# Patient Record
Sex: FEMALE | Race: WHITE | NOT HISPANIC OR LATINO
[De-identification: names, ages, dates, MRNs, and addresses within clinical notes are randomized per-mention and may not be internally consistent; named-entity substitution may affect disease eponyms.]

---

## 2017-11-20 ENCOUNTER — APPOINTMENT (OUTPATIENT)
Dept: OTOLARYNGOLOGY | Facility: CLINIC | Age: 57
End: 2017-11-20
Payer: COMMERCIAL

## 2017-11-20 VITALS
DIASTOLIC BLOOD PRESSURE: 76 MMHG | WEIGHT: 246 LBS | BODY MASS INDEX: 42 KG/M2 | HEIGHT: 64 IN | SYSTOLIC BLOOD PRESSURE: 110 MMHG | HEART RATE: 72 BPM

## 2017-11-20 DIAGNOSIS — Z86.79 PERSONAL HISTORY OF OTHER DISEASES OF THE CIRCULATORY SYSTEM: ICD-10-CM

## 2017-11-20 DIAGNOSIS — Z86.39 PERSONAL HISTORY OF OTHER ENDOCRINE, NUTRITIONAL AND METABOLIC DISEASE: ICD-10-CM

## 2017-11-20 DIAGNOSIS — R07.0 PAIN IN THROAT: ICD-10-CM

## 2017-11-20 DIAGNOSIS — Z83.3 FAMILY HISTORY OF DIABETES MELLITUS: ICD-10-CM

## 2017-11-20 DIAGNOSIS — Z78.9 OTHER SPECIFIED HEALTH STATUS: ICD-10-CM

## 2017-11-20 PROCEDURE — 31575 DIAGNOSTIC LARYNGOSCOPY: CPT

## 2017-11-20 PROCEDURE — 99204 OFFICE O/P NEW MOD 45 MIN: CPT | Mod: 25

## 2017-11-30 ENCOUNTER — APPOINTMENT (OUTPATIENT)
Dept: GASTROENTEROLOGY | Facility: CLINIC | Age: 57
End: 2017-11-30
Payer: COMMERCIAL

## 2017-11-30 VITALS
DIASTOLIC BLOOD PRESSURE: 90 MMHG | HEIGHT: 64 IN | TEMPERATURE: 98.7 F | SYSTOLIC BLOOD PRESSURE: 124 MMHG | BODY MASS INDEX: 42.17 KG/M2 | WEIGHT: 247 LBS

## 2017-11-30 DIAGNOSIS — Z77.22 CONTACT WITH AND (SUSPECTED) EXPOSURE TO ENVIRONMENTAL TOBACCO SMOKE (ACUTE) (CHRONIC): ICD-10-CM

## 2017-11-30 DIAGNOSIS — Z80.3 FAMILY HISTORY OF MALIGNANT NEOPLASM OF BREAST: ICD-10-CM

## 2017-11-30 DIAGNOSIS — Z83.71 FAMILY HISTORY OF COLONIC POLYPS: ICD-10-CM

## 2017-11-30 PROCEDURE — 99203 OFFICE O/P NEW LOW 30 MIN: CPT

## 2017-11-30 RX ORDER — B-COMPLEX WITH VITAMIN C
TABLET ORAL
Refills: 0 | Status: ACTIVE | COMMUNITY

## 2017-11-30 RX ORDER — MV-MIN/FOLIC/VIT K/LYCOP/COQ10 200-100MCG
CAPSULE ORAL
Refills: 0 | Status: ACTIVE | COMMUNITY

## 2017-12-11 ENCOUNTER — MESSAGE (OUTPATIENT)
Age: 57
End: 2017-12-11

## 2017-12-29 ENCOUNTER — APPOINTMENT (OUTPATIENT)
Dept: OTOLARYNGOLOGY | Facility: CLINIC | Age: 57
End: 2017-12-29
Payer: COMMERCIAL

## 2017-12-29 VITALS
DIASTOLIC BLOOD PRESSURE: 77 MMHG | HEIGHT: 64 IN | BODY MASS INDEX: 42.17 KG/M2 | SYSTOLIC BLOOD PRESSURE: 129 MMHG | HEART RATE: 72 BPM | WEIGHT: 247 LBS

## 2017-12-29 PROCEDURE — 99214 OFFICE O/P EST MOD 30 MIN: CPT | Mod: 25

## 2017-12-29 PROCEDURE — 31575 DIAGNOSTIC LARYNGOSCOPY: CPT

## 2017-12-29 RX ORDER — METOPROLOL SUCCINATE 50 MG/1
50 TABLET, EXTENDED RELEASE ORAL
Qty: 30 | Refills: 0 | Status: ACTIVE | COMMUNITY
Start: 2017-03-23

## 2017-12-29 RX ORDER — CLOBETASOL PROPIONATE 0.5 MG/G
0.05 CREAM TOPICAL
Qty: 15 | Refills: 0 | Status: ACTIVE | COMMUNITY
Start: 2016-11-16

## 2017-12-29 RX ORDER — BLOOD SUGAR DIAGNOSTIC
STRIP MISCELLANEOUS
Qty: 50 | Refills: 0 | Status: ACTIVE | COMMUNITY
Start: 2017-06-24

## 2017-12-29 RX ORDER — LANCETS 30 GAUGE
EACH MISCELLANEOUS
Qty: 100 | Refills: 0 | Status: ACTIVE | COMMUNITY
Start: 2017-06-24

## 2018-01-03 ENCOUNTER — MEDICATION RENEWAL (OUTPATIENT)
Age: 58
End: 2018-01-03

## 2018-01-15 ENCOUNTER — FORM ENCOUNTER (OUTPATIENT)
Age: 58
End: 2018-01-15

## 2018-01-16 ENCOUNTER — APPOINTMENT (OUTPATIENT)
Dept: MRI IMAGING | Facility: CLINIC | Age: 58
End: 2018-01-16

## 2018-01-16 ENCOUNTER — OUTPATIENT (OUTPATIENT)
Dept: OUTPATIENT SERVICES | Facility: HOSPITAL | Age: 58
LOS: 1 days | End: 2018-01-16
Payer: COMMERCIAL

## 2018-01-16 ENCOUNTER — APPOINTMENT (OUTPATIENT)
Dept: CT IMAGING | Facility: CLINIC | Age: 58
End: 2018-01-16

## 2018-01-16 DIAGNOSIS — Z98.89 OTHER SPECIFIED POSTPROCEDURAL STATES: Chronic | ICD-10-CM

## 2018-01-16 DIAGNOSIS — K60.3 ANAL FISTULA: Chronic | ICD-10-CM

## 2018-01-16 DIAGNOSIS — Z00.8 ENCOUNTER FOR OTHER GENERAL EXAMINATION: ICD-10-CM

## 2018-01-16 PROCEDURE — 70491 CT SOFT TISSUE NECK W/DYE: CPT

## 2018-01-16 PROCEDURE — 70491 CT SOFT TISSUE NECK W/DYE: CPT | Mod: 26

## 2018-01-16 PROCEDURE — 71250 CT THORAX DX C-: CPT

## 2018-01-16 PROCEDURE — 70553 MRI BRAIN STEM W/O & W/DYE: CPT

## 2018-01-16 PROCEDURE — 71250 CT THORAX DX C-: CPT | Mod: 26

## 2018-01-16 PROCEDURE — 70553 MRI BRAIN STEM W/O & W/DYE: CPT | Mod: 26

## 2018-01-16 PROCEDURE — A9585: CPT

## 2018-01-24 ENCOUNTER — APPOINTMENT (OUTPATIENT)
Dept: GASTROENTEROLOGY | Facility: AMBULATORY MEDICAL SERVICES | Age: 58
End: 2018-01-24

## 2018-03-01 ENCOUNTER — APPOINTMENT (OUTPATIENT)
Dept: OTOLARYNGOLOGY | Facility: CLINIC | Age: 58
End: 2018-03-01
Payer: COMMERCIAL

## 2018-03-01 VITALS
WEIGHT: 250 LBS | BODY MASS INDEX: 42.68 KG/M2 | SYSTOLIC BLOOD PRESSURE: 122 MMHG | HEIGHT: 64 IN | HEART RATE: 74 BPM | DIASTOLIC BLOOD PRESSURE: 72 MMHG

## 2018-03-01 DIAGNOSIS — R49.0 DYSPHONIA: ICD-10-CM

## 2018-03-01 DIAGNOSIS — J38.01 PARALYSIS OF VOCAL CORDS AND LARYNX, UNILATERAL: ICD-10-CM

## 2018-03-01 DIAGNOSIS — J38.4 EDEMA OF LARYNX: ICD-10-CM

## 2018-03-01 DIAGNOSIS — J38.3 OTHER DISEASES OF VOCAL CORDS: ICD-10-CM

## 2018-03-01 PROCEDURE — 31579 LARYNGOSCOPY TELESCOPIC: CPT

## 2018-03-01 PROCEDURE — 99214 OFFICE O/P EST MOD 30 MIN: CPT | Mod: 25

## 2018-04-26 ENCOUNTER — APPOINTMENT (OUTPATIENT)
Dept: OTOLARYNGOLOGY | Facility: CLINIC | Age: 58
End: 2018-04-26

## 2018-10-16 ENCOUNTER — APPOINTMENT (OUTPATIENT)
Dept: GASTROENTEROLOGY | Facility: CLINIC | Age: 58
End: 2018-10-16
Payer: COMMERCIAL

## 2018-10-16 VITALS
DIASTOLIC BLOOD PRESSURE: 90 MMHG | OXYGEN SATURATION: 97 % | HEIGHT: 64 IN | TEMPERATURE: 98.4 F | BODY MASS INDEX: 48.14 KG/M2 | WEIGHT: 282 LBS | SYSTOLIC BLOOD PRESSURE: 138 MMHG | HEART RATE: 81 BPM

## 2018-10-16 PROCEDURE — 99214 OFFICE O/P EST MOD 30 MIN: CPT

## 2018-10-16 RX ORDER — POLYETHYLENE GLYCOL 3350, SODIUM SULFATE, SODIUM CHLORIDE, POTASSIUM CHLORIDE, ASCORBIC ACID, SODIUM ASCORBATE 7.5-2.691G
100 KIT ORAL
Qty: 1 | Refills: 0 | Status: DISCONTINUED | COMMUNITY
Start: 2017-12-11 | End: 2018-10-16

## 2018-10-16 RX ORDER — CLARITHROMYCIN 500 MG/1
500 TABLET, FILM COATED, EXTENDED RELEASE ORAL
Qty: 14 | Refills: 0 | Status: DISCONTINUED | COMMUNITY
Start: 2017-12-14 | End: 2018-10-16

## 2018-10-16 RX ORDER — SODIUM SULFATE, POTASSIUM SULFATE, MAGNESIUM SULFATE 17.5; 3.13; 1.6 G/ML; G/ML; G/ML
17.5-3.13-1.6 SOLUTION, CONCENTRATE ORAL
Qty: 1 | Refills: 0 | Status: DISCONTINUED | COMMUNITY
Start: 2017-11-30 | End: 2018-10-16

## 2018-10-16 RX ORDER — METOPROLOL TARTRATE 75 MG/1
TABLET, FILM COATED ORAL
Refills: 0 | Status: DISCONTINUED | COMMUNITY
End: 2018-10-16

## 2018-10-16 RX ORDER — PSYLLIUM HUSK 0.4 G
CAPSULE ORAL
Refills: 0 | Status: ACTIVE | COMMUNITY

## 2018-10-16 RX ORDER — METHYLPREDNISOLONE 4 MG/1
4 TABLET ORAL
Qty: 1 | Refills: 3 | Status: DISCONTINUED | COMMUNITY
Start: 2017-11-20 | End: 2018-10-16

## 2018-11-29 ENCOUNTER — APPOINTMENT (OUTPATIENT)
Dept: GASTROENTEROLOGY | Facility: AMBULATORY MEDICAL SERVICES | Age: 58
End: 2018-11-29

## 2018-12-21 ENCOUNTER — APPOINTMENT (OUTPATIENT)
Dept: GASTROENTEROLOGY | Facility: AMBULATORY MEDICAL SERVICES | Age: 58
End: 2018-12-21

## 2019-01-29 ENCOUNTER — APPOINTMENT (OUTPATIENT)
Dept: GASTROENTEROLOGY | Facility: AMBULATORY MEDICAL SERVICES | Age: 59
End: 2019-01-29
Payer: COMMERCIAL

## 2019-01-29 PROCEDURE — 45380 COLONOSCOPY AND BIOPSY: CPT | Mod: 33,59

## 2019-01-29 PROCEDURE — 45385 COLONOSCOPY W/LESION REMOVAL: CPT

## 2019-03-06 ENCOUNTER — APPOINTMENT (OUTPATIENT)
Dept: GASTROENTEROLOGY | Facility: CLINIC | Age: 59
End: 2019-03-06
Payer: COMMERCIAL

## 2019-03-06 VITALS
SYSTOLIC BLOOD PRESSURE: 124 MMHG | BODY MASS INDEX: 47.97 KG/M2 | DIASTOLIC BLOOD PRESSURE: 80 MMHG | TEMPERATURE: 98.8 F | WEIGHT: 281 LBS | HEIGHT: 64 IN | OXYGEN SATURATION: 97 % | HEART RATE: 86 BPM

## 2019-03-06 DIAGNOSIS — D12.6 BENIGN NEOPLASM OF COLON, UNSPECIFIED: ICD-10-CM

## 2019-03-06 DIAGNOSIS — K64.4 RESIDUAL HEMORRHOIDAL SKIN TAGS: ICD-10-CM

## 2019-03-06 DIAGNOSIS — K57.30 DIVERTICULOSIS OF LARGE INTESTINE W/OUT PERFORATION OR ABSCESS W/OUT BLEEDING: ICD-10-CM

## 2019-03-06 DIAGNOSIS — K64.8 OTHER HEMORRHOIDS: ICD-10-CM

## 2019-03-06 DIAGNOSIS — Z86.010 PERSONAL HISTORY OF COLONIC POLYPS: ICD-10-CM

## 2019-03-06 PROCEDURE — 99213 OFFICE O/P EST LOW 20 MIN: CPT

## 2019-03-06 NOTE — REVIEW OF SYSTEMS
[Recent Weight Loss (___ Lbs)] : recent [unfilled] ~Ulb weight loss [As Noted in HPI] : as noted in HPI [Negative] : Heme/Lymph

## 2019-03-07 PROBLEM — D12.6 ADENOMATOUS POLYP OF COLON: Status: ACTIVE | Noted: 2019-03-07

## 2019-03-07 PROBLEM — K57.30 DIVERTICULOSIS OF LARGE INTESTINE WITHOUT HEMORRHAGE: Status: ACTIVE | Noted: 2019-03-07

## 2019-03-07 PROBLEM — K64.4 EXTERNAL HEMORRHOID: Status: ACTIVE | Noted: 2019-03-07

## 2019-03-07 PROBLEM — K64.8 HEMORRHOIDS, INTERNAL: Status: ACTIVE | Noted: 2019-03-07

## 2019-03-07 PROBLEM — Z86.010 HISTORY OF ADENOMATOUS POLYP OF COLON: Status: RESOLVED | Noted: 2017-11-30 | Resolved: 2019-03-07

## 2019-03-10 NOTE — ASSESSMENT
[FreeTextEntry1] : Patient with multiple adenomatous polyps and diverticulosis. She has findings of severe reflux and still requires an EGD.\par \par We will repeat a colonoscopy in 3 years that is January 2022.\par \par Information was given to the patient regarding diverticulosis and a high-fiber diet.\par \par Patient is to have EGD in May.\par \par \par Plan from 10/16/18 - Patient with a history of colonic polyps and a father with colon cancer at age 60 who is due for a colonoscopy. In ENT doctor found evidence of severe reflux changes.\par \par An EGD and colonoscopy have been scheduled. The risks, benefits, alternatives, and limitations of the procedures, including the possibility of missed lesions, were explained. The patient will require anesthesia evaluation prior to the procedure given her BMI of 48.41.\par \par \par Plan from 11/30/17 - Patient with a history of colonic polyps and a father with colon cancer at age 60. She is due for a screening colonoscopy.\par \par A colonoscopy has been scheduled. The risks, benefits, alternatives, and limitations of the procedure, including the possibility of missed lesions, were explained. We will wait until the patient's ENT issues and URI symptoms resolve before performing this test. Given the patient's BMI of 42.4, the patient will require anesthesia clearance prior to the procedure.

## 2019-03-10 NOTE — PHYSICAL EXAM
[General Appearance - Alert] : alert [General Appearance - In No Acute Distress] : in no acute distress [Neck Appearance] : the appearance of the neck was normal [Neck Cervical Mass (___cm)] : no neck mass was observed [Jugular Venous Distention Increased] : there was no jugular-venous distention [Thyroid Diffuse Enlargement] : the thyroid was not enlarged [Thyroid Nodule] : there were no palpable thyroid nodules [Auscultation Breath Sounds / Voice Sounds] : lungs were clear to auscultation bilaterally [Heart Rate And Rhythm] : heart rate was normal and rhythm regular [Heart Sounds] : normal S1 and S2 [Heart Sounds Gallop] : no gallops [Murmurs] : no murmurs [Heart Sounds Pericardial Friction Rub] : no pericardial rub [Edema] : there was no peripheral edema [Bowel Sounds] : normal bowel sounds [Abdomen Soft] : soft [Abdomen Tenderness] : non-tender [] : no hepato-splenomegaly [Abdomen Mass (___ Cm)] : no abdominal mass palpated [No CVA Tenderness] : no ~M costovertebral angle tenderness [No Spinal Tenderness] : no spinal tenderness [Oriented To Time, Place, And Person] : oriented to person, place, and time [Impaired Insight] : insight and judgment were intact [Affect] : the affect was normal [FreeTextEntry1] : obese

## 2019-03-10 NOTE — CONSULT LETTER
[FreeTextEntry1] : Dear Dr. Jose D Zuniga,\par \par I had the pleasure of seeing your patient KEISHA VALDERRAMA in the office today.  My office note is attached.\par \par Thank you very much for allowing me to participate in the care of your patient.\par \par Sincerely,\par \par Aleks Fajardo M.D., FACG, FACP\par Director, Celiac Program at [facility]\par  of Medicine\par [employer] at [facility] Practice Director,\par [facility] - Gastroenterology/Internal Medicine at [place]\par [address] - [address]\[place] [address]\par Tel: [phone]\par Email: [email or web address]

## 2019-03-10 NOTE — HISTORY OF PRESENT ILLNESS
[FreeTextEntry1] : The patient is status post colonoscopy performed on January 29, 2019. An EGD was also supposed to be done but was postponed secondary to an active upper respiratory infection. The colonoscopy was significant for removal of 5 adenomatous polyps along with diverticulosis and internal and external hemorrhoids which are asymptomatic. Although an ENT doctor is told her that she has severe reflux, she denies heartburn or dysphagia. She is feeling well otherwise with a negative review of systems.\par \par \par Note from 10/16/18 - The patient has a history of adenomatous colonic polyps and a father with colon cancer at age 60. She underwent evaluation by an ENT doctor who found severe reflux changes. She is currently taking omeprazole 40 mg one to 2 times a week. She gets occasional "upset stomach" and had a globus sensation which has resolved. She denies dysphagia or abdominal pain. She has one solid bowel movement a day without melena or prior blood per rectum. The patient has gained 35 pounds in the past year. The patient has not been hospitalized in the past year and denies any cardiac issues.\par \par \par Note from 11/30/17 - The patient is a 57-year-old woman with a history of adenomatous polyps and a father who had colon cancer at age 60. Her last colonoscopy was 4 years ago. She feels well denying abdominal pain, diarrhea, constipation. She has one bowel movement every one to 2 days without melena or bright red blood per rectum. She denies heartburn or dysphagia. She has lost 55 pounds in the past 5 months intentionally.\par \par The patient is currently under the care of an ENT doctor for a paralyzed vocal cord of unclear etiology. She is currently on a Medrol Dosepak. She also has URI symptoms.\par \par  The patient has not been hospitalized in the past year and denies any cardiac issues.

## 2019-03-11 ENCOUNTER — MESSAGE (OUTPATIENT)
Age: 59
End: 2019-03-11

## 2019-04-29 ENCOUNTER — APPOINTMENT (OUTPATIENT)
Dept: GASTROENTEROLOGY | Facility: AMBULATORY MEDICAL SERVICES | Age: 59
End: 2019-04-29

## 2021-01-25 ENCOUNTER — OUTPATIENT (OUTPATIENT)
Dept: OUTPATIENT SERVICES | Facility: HOSPITAL | Age: 61
LOS: 1 days | End: 2021-01-25
Payer: COMMERCIAL

## 2021-01-25 VITALS
HEART RATE: 84 BPM | WEIGHT: 291.89 LBS | OXYGEN SATURATION: 98 % | DIASTOLIC BLOOD PRESSURE: 79 MMHG | SYSTOLIC BLOOD PRESSURE: 151 MMHG | RESPIRATION RATE: 16 BRPM | TEMPERATURE: 98 F | HEIGHT: 64 IN

## 2021-01-25 DIAGNOSIS — Z98.89 OTHER SPECIFIED POSTPROCEDURAL STATES: Chronic | ICD-10-CM

## 2021-01-25 DIAGNOSIS — S83.242A OTHER TEAR OF MEDIAL MENISCUS, CURRENT INJURY, LEFT KNEE, INITIAL ENCOUNTER: ICD-10-CM

## 2021-01-25 DIAGNOSIS — Z01.818 ENCOUNTER FOR OTHER PREPROCEDURAL EXAMINATION: ICD-10-CM

## 2021-01-25 DIAGNOSIS — K60.3 ANAL FISTULA: Chronic | ICD-10-CM

## 2021-01-25 DIAGNOSIS — Z98.890 OTHER SPECIFIED POSTPROCEDURAL STATES: Chronic | ICD-10-CM

## 2021-01-25 DIAGNOSIS — G47.33 OBSTRUCTIVE SLEEP APNEA (ADULT) (PEDIATRIC): ICD-10-CM

## 2021-01-25 DIAGNOSIS — E11.9 TYPE 2 DIABETES MELLITUS WITHOUT COMPLICATIONS: ICD-10-CM

## 2021-01-25 LAB
ANION GAP SERPL CALC-SCNC: 8 MMOL/L — SIGNIFICANT CHANGE UP (ref 5–17)
BUN SERPL-MCNC: 13 MG/DL — SIGNIFICANT CHANGE UP (ref 7–23)
CALCIUM SERPL-MCNC: 9.5 MG/DL — SIGNIFICANT CHANGE UP (ref 8.5–10.1)
CHLORIDE SERPL-SCNC: 101 MMOL/L — SIGNIFICANT CHANGE UP (ref 96–108)
CO2 SERPL-SCNC: 30 MMOL/L — SIGNIFICANT CHANGE UP (ref 22–31)
CREAT SERPL-MCNC: 0.58 MG/DL — SIGNIFICANT CHANGE UP (ref 0.5–1.3)
GLUCOSE SERPL-MCNC: 178 MG/DL — HIGH (ref 70–99)
HCT VFR BLD CALC: 42.1 % — SIGNIFICANT CHANGE UP (ref 34.5–45)
HGB BLD-MCNC: 14 G/DL — SIGNIFICANT CHANGE UP (ref 11.5–15.5)
MCHC RBC-ENTMCNC: 26.8 PG — LOW (ref 27–34)
MCHC RBC-ENTMCNC: 33.3 GM/DL — SIGNIFICANT CHANGE UP (ref 32–36)
MCV RBC AUTO: 80.5 FL — SIGNIFICANT CHANGE UP (ref 80–100)
NRBC # BLD: 0 /100 WBCS — SIGNIFICANT CHANGE UP (ref 0–0)
PLATELET # BLD AUTO: 302 K/UL — SIGNIFICANT CHANGE UP (ref 150–400)
POTASSIUM SERPL-MCNC: 3.6 MMOL/L — SIGNIFICANT CHANGE UP (ref 3.5–5.3)
POTASSIUM SERPL-SCNC: 3.6 MMOL/L — SIGNIFICANT CHANGE UP (ref 3.5–5.3)
RBC # BLD: 5.23 M/UL — HIGH (ref 3.8–5.2)
RBC # FLD: 15.1 % — HIGH (ref 10.3–14.5)
SODIUM SERPL-SCNC: 139 MMOL/L — SIGNIFICANT CHANGE UP (ref 135–145)
WBC # BLD: 11.31 K/UL — HIGH (ref 3.8–10.5)
WBC # FLD AUTO: 11.31 K/UL — HIGH (ref 3.8–10.5)

## 2021-01-25 PROCEDURE — 80048 BASIC METABOLIC PNL TOTAL CA: CPT

## 2021-01-25 PROCEDURE — 93005 ELECTROCARDIOGRAM TRACING: CPT

## 2021-01-25 PROCEDURE — 83036 HEMOGLOBIN GLYCOSYLATED A1C: CPT

## 2021-01-25 PROCEDURE — 93010 ELECTROCARDIOGRAM REPORT: CPT

## 2021-01-25 PROCEDURE — 85027 COMPLETE CBC AUTOMATED: CPT

## 2021-01-25 PROCEDURE — 36415 COLL VENOUS BLD VENIPUNCTURE: CPT

## 2021-01-25 PROCEDURE — G0463: CPT

## 2021-01-25 NOTE — H&P PST ADULT - NSICDXPROBLEM_GEN_ALL_CORE_FT
PROBLEM DIAGNOSES  Problem: Other tear of medial meniscus, current injury, left knee, initial encounter  Assessment and Plan: scheduled for left knee arthroscopy - partial, medial meniscectomy and chondroplasty on 2/2/2021  with Dr. Adams     Problem: Pre-op evaluation  Assessment and Plan: Labs  To make appt for COVID PCR 48-72 before DOS.  Pre op and Hibiclens instructions reviewed and given. Take routine am meds DOS with sip of water. Instructed to hold and/or avoid NSAIDs and OTC supplements. Tylenol is ok. Verbalized understanding     Problem: Diabetes mellitus  Assessment and Plan: A1c pending. Patient advised that (s)he would need endo clearance if A1c came back 9 or above. Hold Metformin for 24 hours. Last dose. Hold all diabetic meds DOS. Verbalized understanding. Fingerstick am of surgery.     Problem: ANIYA (obstructive sleep apnea)  Assessment and Plan: Close post op monitoring. Advised of a possible extended stay.        PROBLEM DIAGNOSES  Problem: Other tear of medial meniscus, current injury, left knee, initial encounter  Assessment and Plan: scheduled for left knee arthroscopy - partial, medial meniscectomy and chondroplasty on 2/2/2021  with Dr. Adams     Problem: Pre-op evaluation  Assessment and Plan: Labs- CBC, BMP, A1c and EKG  To make appt for COVID PCR 48-72 before DOS.   MC with Dr. Li  Pre op and Hibiclens instructions reviewed and given. Take routine am meds DOS with sip of water.Hold HCTZ DOS. Instructed to hold and/or avoid NSAIDs and OTC supplements. Tylenol is ok. Verbalized understanding     Problem: Diabetes mellitus  Assessment and Plan: A1c pending. Patient advised that (s)he would need endo clearance if A1c came back 9 or above. Hold Metformin for 24 hours. Last dose. 1/31. Hold all diabetic meds DOS. Verbalized understanding. Fingerstick am of surgery.     Problem: ANIYA (obstructive sleep apnea)  Assessment and Plan: Close post op monitoring. Advised of a possible extended stay.

## 2021-01-25 NOTE — H&P PST ADULT - SPIRITUAL CULTURAL, RELIGIOUS PRACTICES/VALUES, PROFILE
Today, the patient says she often has a full feeling and an "upset stomach." She also notes occasional bloating especially with spicey food. Other reported symptoms are nausea, diarrhea, and dizziness. Last episode of nausea was in 2019. She notes soft, "pasty" diarrhea 1x/week. She says it's diet related, like with spicy foods. Last episode of rectal bleeding was 8-9 months ago. She denies abdominal pain. She has never had a colon. She hasn't seen a gynecologist since having endometrial cancer, but an appt is now scheduled.  Labs 3/2/20 - Phosphorous 4.7, triglycerides 243. CBC, CMP, hepatic panel, vit B12 all normal.
no

## 2021-01-25 NOTE — H&P PST ADULT - ASSESSMENT
scheduled for left knee arthroscopy - partial, medial meniscectomy and chondroplasty on 2/2/2021  with Dr. Adams

## 2021-01-25 NOTE — H&P PST ADULT - NSICDXPASTMEDICALHX_GEN_ALL_CORE_FT
PAST MEDICAL HISTORY:  Anemia     Diabetes mellitus     GERD (gastroesophageal reflux disease)     Herniated disc, cervical     HTN (hypertension)     Morbidly obese     ANIYA (obstructive sleep apnea)     Other tear of medial meniscus, current injury, left knee, initial encounter     Phlebitis of left arm 2013    Seasonal allergies      PAST MEDICAL HISTORY:  Anemia     Diabetes mellitus     GERD (gastroesophageal reflux disease)     Herniated disc, cervical     HTN (hypertension)     Morbidly obese     Other tear of medial meniscus, current injury, left knee, initial encounter     Phlebitis of left arm 2013    Seasonal allergies

## 2021-01-25 NOTE — H&P PST ADULT - ACTIVITY
walks 20-30 mins. alternating with stat. bike 2-3 days/wk Able to walk up a flight steps with no problem

## 2021-01-25 NOTE — H&P PST ADULT - NSICDXFAMILYHX_GEN_ALL_CORE_FT
FAMILY HISTORY:  Father  Still living? Unknown  Family history of alcoholism, Age at diagnosis: Age Unknown  Family history of colon cancer, Age at diagnosis: Age Unknown    Mother  Still living? Unknown  Family history of depression, Age at diagnosis: Age Unknown

## 2021-01-25 NOTE — H&P PST ADULT - HISTORY OF PRESENT ILLNESS
59 yo female presents to PST scheduled for left knee arthroscopy - partial, medial meniscectomy and chondroplasty on 2/2/2021  with Dr. Adams  59 yo obese female presents to PST scheduled for left knee arthroscopy - partial, medial meniscectomy and chondroplasty on 2/2/2021  with Dr. Adams. H/O a fall in Sept/Oct. Started to have left knee pain since Dec 2020 59 yo morbidly obese female with HTN and T2DM, presents to [facility] scheduled for left knee arthroscopy - partial, medial meniscectomy and chondroplasty on 2/2/2021  with Dr. Adams. H/O a fall in Sept/Oct. Started to have left knee pain since Dec 2020

## 2021-01-25 NOTE — H&P PST ADULT - NSANTHOSAYNRD_GEN_A_CORE
Denies ANIYA. Reports her ANIYA was  r/t GERD that was treated and resolved/No. ANIYA screening performed.  STOP BANG Legend: 0-2 = LOW Risk; 3-4 = INTERMEDIATE Risk; 5-8 = HIGH Risk Denies ANIYA. Reports that her study was inconclusive and that her ANIYA was r/t GERD that was treated and has resolved/No. ANIYA screening performed.  STOP BANG Legend: 0-2 = LOW Risk; 3-4 = INTERMEDIATE Risk; 5-8 = HIGH Risk

## 2021-01-25 NOTE — H&P PST ADULT - NSICDXPASTSURGICALHX_GEN_ALL_CORE_FT
PAST SURGICAL HISTORY:  Anal fistula repair-1991    H/O arthroscopy of left knee     H/O:  section 1991    History of appendectomy 1999    History of cholecystectomy 2003    History of D&C Nalini     S/P lumpectomy, right breast 2013

## 2021-01-26 LAB
A1C WITH ESTIMATED AVERAGE GLUCOSE RESULT: 10.2 % — HIGH (ref 4–5.6)
ESTIMATED AVERAGE GLUCOSE: 246 MG/DL — HIGH (ref 68–114)

## 2021-01-27 PROBLEM — S83.242A OTHER TEAR OF MEDIAL MENISCUS, CURRENT INJURY, LEFT KNEE, INITIAL ENCOUNTER: Chronic | Status: ACTIVE | Noted: 2021-01-25

## 2021-03-12 ENCOUNTER — OUTPATIENT (OUTPATIENT)
Dept: OUTPATIENT SERVICES | Facility: HOSPITAL | Age: 61
LOS: 1 days | End: 2021-03-12
Payer: COMMERCIAL

## 2021-03-12 VITALS
DIASTOLIC BLOOD PRESSURE: 84 MMHG | TEMPERATURE: 98 F | SYSTOLIC BLOOD PRESSURE: 134 MMHG | WEIGHT: 276.02 LBS | RESPIRATION RATE: 16 BRPM | OXYGEN SATURATION: 97 % | HEIGHT: 64 IN | HEART RATE: 88 BPM

## 2021-03-12 DIAGNOSIS — Z98.890 OTHER SPECIFIED POSTPROCEDURAL STATES: Chronic | ICD-10-CM

## 2021-03-12 DIAGNOSIS — S83.242A OTHER TEAR OF MEDIAL MENISCUS, CURRENT INJURY, LEFT KNEE, INITIAL ENCOUNTER: ICD-10-CM

## 2021-03-12 DIAGNOSIS — Z01.818 ENCOUNTER FOR OTHER PREPROCEDURAL EXAMINATION: ICD-10-CM

## 2021-03-12 DIAGNOSIS — Z98.89 OTHER SPECIFIED POSTPROCEDURAL STATES: Chronic | ICD-10-CM
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
## 2021-03-12 DIAGNOSIS — K60.3 ANAL FISTULA: Chronic | ICD-10-CM

## 2021-03-12 LAB
A1C WITH ESTIMATED AVERAGE GLUCOSE RESULT: 7.9 % — HIGH (ref 4–5.6)
ALBUMIN SERPL ELPH-MCNC: 4 G/DL — SIGNIFICANT CHANGE UP (ref 3.3–5)
ALP SERPL-CCNC: 80 U/L — SIGNIFICANT CHANGE UP (ref 40–120)
ALT FLD-CCNC: 35 U/L — SIGNIFICANT CHANGE UP (ref 12–78)
ANION GAP SERPL CALC-SCNC: 9 MMOL/L — SIGNIFICANT CHANGE UP (ref 5–17)
AST SERPL-CCNC: 19 U/L — SIGNIFICANT CHANGE UP (ref 15–37)
BILIRUB SERPL-MCNC: 0.3 MG/DL — SIGNIFICANT CHANGE UP (ref 0.2–1.2)
BUN SERPL-MCNC: 16 MG/DL — SIGNIFICANT CHANGE UP (ref 7–23)
CALCIUM SERPL-MCNC: 9.3 MG/DL — SIGNIFICANT CHANGE UP (ref 8.5–10.1)
CHLORIDE SERPL-SCNC: 102 MMOL/L — SIGNIFICANT CHANGE UP (ref 96–108)
CO2 SERPL-SCNC: 28 MMOL/L — SIGNIFICANT CHANGE UP (ref 22–31)
CREAT SERPL-MCNC: 0.57 MG/DL — SIGNIFICANT CHANGE UP (ref 0.5–1.3)
ESTIMATED AVERAGE GLUCOSE: 180 MG/DL — HIGH (ref 68–114)
GLUCOSE SERPL-MCNC: 134 MG/DL — HIGH (ref 70–99)
HCT VFR BLD CALC: 41.7 % — SIGNIFICANT CHANGE UP (ref 34.5–45)
HGB BLD-MCNC: 14 G/DL — SIGNIFICANT CHANGE UP (ref 11.5–15.5)
MCHC RBC-ENTMCNC: 27.1 PG — SIGNIFICANT CHANGE UP (ref 27–34)
MCHC RBC-ENTMCNC: 33.6 GM/DL — SIGNIFICANT CHANGE UP (ref 32–36)
MCV RBC AUTO: 80.8 FL — SIGNIFICANT CHANGE UP (ref 80–100)
NRBC # BLD: 0 /100 WBCS — SIGNIFICANT CHANGE UP (ref 0–0)
PLATELET # BLD AUTO: 291 K/UL — SIGNIFICANT CHANGE UP (ref 150–400)
POTASSIUM SERPL-MCNC: 3.7 MMOL/L — SIGNIFICANT CHANGE UP (ref 3.5–5.3)
POTASSIUM SERPL-SCNC: 3.7 MMOL/L — SIGNIFICANT CHANGE UP (ref 3.5–5.3)
PROT SERPL-MCNC: 8.1 G/DL — SIGNIFICANT CHANGE UP (ref 6–8.3)
RBC # BLD: 5.16 M/UL — SIGNIFICANT CHANGE UP (ref 3.8–5.2)
RBC # FLD: 14.9 % — HIGH (ref 10.3–14.5)
SODIUM SERPL-SCNC: 139 MMOL/L — SIGNIFICANT CHANGE UP (ref 135–145)
WBC # BLD: 9.89 K/UL — SIGNIFICANT CHANGE UP (ref 3.8–10.5)
WBC # FLD AUTO: 9.89 K/UL — SIGNIFICANT CHANGE UP (ref 3.8–10.5)

## 2021-03-12 PROCEDURE — 36415 COLL VENOUS BLD VENIPUNCTURE: CPT

## 2021-03-12 PROCEDURE — G0463: CPT

## 2021-03-12 PROCEDURE — 80053 COMPREHEN METABOLIC PANEL: CPT

## 2021-03-12 PROCEDURE — 83036 HEMOGLOBIN GLYCOSYLATED A1C: CPT

## 2021-03-12 PROCEDURE — 85027 COMPLETE CBC AUTOMATED: CPT

## 2021-03-12 RX ORDER — NAPHAZOLINE HCL 0.1 %
0 DROPS OPHTHALMIC (EYE)
Qty: 0 | Refills: 0 | DISCHARGE

## 2021-03-12 RX ORDER — METFORMIN HYDROCHLORIDE 850 MG/1
1 TABLET ORAL
Qty: 0 | Refills: 0 | DISCHARGE

## 2021-03-12 NOTE — H&P PST ADULT - NSANTHOSAYNRD_GEN_A_CORE
Denies ANIYA. Reports that her study was inconclusive and that her ANIYA was r/t GERD that was treated and has resolved/No. ANIYA screening performed.  STOP BANG Legend: 0-2 = LOW Risk; 3-4 = INTERMEDIATE Risk; 5-8 = HIGH Risk

## 2021-03-12 NOTE — H&P PST ADULT - NSICDXPROBLEM_GEN_ALL_CORE_FT
PROBLEM DIAGNOSES  Problem: Other tear of medial meniscus, current injury, left knee, initial encounter  Assessment and Plan: scheduled for left knee arthroscopy - partial, medial meniscectomy and chondroplasty on 2/2/2021  with Dr. Adams     Problem: Pre-op evaluation  Assessment and Plan: Labs- CBC, BMP, A1c and EKG  To make appt for COVID PCR 48-72 before DOS.   MC with Dr. Li  Pre op and Hibiclens instructions reviewed and given. Take routine am meds DOS with sip of water.Hold HCTZ DOS. Instructed to hold and/or avoid NSAIDs and OTC supplements. Tylenol is ok. Verbalized understanding     Problem: Diabetes mellitus  Assessment and Plan: A1c pending. Patient advised that (s)he would need endo clearance if A1c came back 9 or above. Hold Metformin for 24 hours. Last dose. 1/31. Hold all diabetic meds DOS. Verbalized understanding. Fingerstick am of surgery.     Problem: ANIYA (obstructive sleep apnea)  Assessment and Plan: Close post op monitoring. Advised of a possible extended stay.        R/O PROBLEM DIAGNOSES  Problem: Other tear of medial meniscus, current injury, left knee, initial encounter  Assessment and Plan: Check labs cbc cmp hgb a1c  ekg  medical clearance  endocrine clearance  hibiclens not given  patietn currently has eczema advised to use Dial or Lever Antibacterial Soap  Patient is aware that she will be monitored for 3-6 hours post op  preop instructions were given  3/16 stop Glucosamine Chondroitin, Cranberry and Fish Oil  3/22 no evening dose of Metformin   No Metformin the day of surgery  patient is aware that she will be scheduled for covid test and should socially isolate after test.   patient verbalizes understanding of instructions

## 2021-03-12 NOTE — H&P PST ADULT - SOURCE OF INFORMATION, PROFILE
Immediate Post-Operative Note      PreOp Diagnosis:   ORIF Salter III intraarticular distal tibial fracture of ankle weightbearing surface, left    PostOp Diagnosis: Same    Procedure(s) (LRB):  TIBIA ORIF - DISTAL (SALTER III PEDIATRIC FRACTURE) (Left)    Surgeon(s):  Dave Morel M.D.    Anesthesiologist/Type of Anesthesia:  Anesthesiologist: Hernesto Larry M.D./General    Surgical Staff:  Circulator: Nova Sanchez R.N.  Relief Circulator: Vinay Angela R.N.  Scrub Person: Violette Holden  Radiology Technician: Bean Molina 2.5mm cannulated screws      1/10/2017 1:06 PM Dave Morel    
patient

## 2021-03-12 NOTE — H&P PST ADULT - HISTORY OF PRESENT ILLNESS
59 yo morbidly obese female with HTN and T2DM, presents to [facility] scheduled for left knee arthroscopy - partial, medial meniscectomy and chondroplasty on 3/23//2021  with Dr. Adams. H/O a fall in Sept/Oct. Started to have left knee pain since Dec 2020.  Patiient was treated with steroid injection and oral steroids for 10 days with no releif

## 2021-03-12 NOTE — H&P PST ADULT - NSICDXPASTMEDICALHX_GEN_ALL_CORE_FT
PAST MEDICAL HISTORY:  Anemia not a current problem    Diabetes mellitus type 2    GERD (gastroesophageal reflux disease) no longer has symptoms    H/O eczema hands seasonal  current now    Herniated disc, cervical     HTN (hypertension)     Morbidly obese     Other tear of medial meniscus, current injury, left knee, initial encounter     Phlebitis of left arm 2013    Seasonal allergies

## 2021-03-12 NOTE — H&P PST ADULT - NSICDXFAMILYHX_GEN_ALL_CORE_FT
FAMILY HISTORY:  FH: melanoma, brother 62    Father  Still living? Unknown  Family history of alcoholism, Age at diagnosis: Age Unknown  Family history of colon cancer, Age at diagnosis: Age Unknown    Mother  Still living? Unknown  Family history of depression, Age at diagnosis: Age Unknown

## 2021-03-12 NOTE — H&P PST ADULT - MUSCULOSKELETAL
detailed exam details… decreased ROM/decreased ROM due to pain No joint pain, swelling or deformity; no limitation of movement

## 2021-03-12 NOTE — H&P PST ADULT - GENERAL COMMENTS
Denies recent international travel, recent illness and sick contact with COVID in the last 3 weekst 16 pounds since February 2021

## 2021-03-12 NOTE — H&P PST ADULT - ASSESSMENT
scheduled for left knee arthroscopy - partial, medial meniscectomy and chondroplasty on 2/2/2021  with Dr. Adams  59 yo morbidly obese female with HTN and T2DM, presents to [facility] scheduled for left knee arthroscopy - partial, medial meniscectomy and chondroplasty on 3/23//2021  with Dr. Adams

## 2021-03-18 PROBLEM — Z87.2 PERSONAL HISTORY OF DISEASES OF THE SKIN AND SUBCUTANEOUS TISSUE: Chronic | Status: ACTIVE | Noted: 2021-03-12

## 2021-03-18 PROBLEM — E11.9 TYPE 2 DIABETES MELLITUS WITHOUT COMPLICATIONS: Chronic | Status: ACTIVE | Noted: 2021-01-25

## 2021-03-21 ENCOUNTER — OUTPATIENT (OUTPATIENT)
Dept: OUTPATIENT SERVICES | Facility: HOSPITAL | Age: 61
LOS: 1 days | End: 2021-03-21
Payer: COMMERCIAL

## 2021-03-21 DIAGNOSIS — Z20.828 CONTACT WITH AND (SUSPECTED) EXPOSURE TO OTHER VIRAL COMMUNICABLE DISEASES: ICD-10-CM

## 2021-03-21 DIAGNOSIS — Z98.890 OTHER SPECIFIED POSTPROCEDURAL STATES: Chronic | ICD-10-CM

## 2021-03-21 DIAGNOSIS — K60.3 ANAL FISTULA: Chronic | ICD-10-CM

## 2021-03-21 DIAGNOSIS — Z98.89 OTHER SPECIFIED POSTPROCEDURAL STATES: Chronic | ICD-10-CM

## 2021-03-21 LAB — SARS-COV-2 RNA SPEC QL NAA+PROBE: SIGNIFICANT CHANGE UP

## 2021-03-21 PROCEDURE — U0003: CPT

## 2021-03-21 PROCEDURE — U0005: CPT

## 2021-03-22 ENCOUNTER — TRANSCRIPTION ENCOUNTER (OUTPATIENT)
Age: 61
End: 2021-03-22

## 2021-03-22 RX ORDER — SODIUM CHLORIDE 9 MG/ML
1000 INJECTION, SOLUTION INTRAVENOUS
Refills: 0 | Status: DISCONTINUED | OUTPATIENT
Start: 2021-03-23 | End: 2021-03-23

## 2021-03-22 NOTE — ASU PATIENT PROFILE, ADULT - PMH
Anemia  not a current problem  Diabetes mellitus  type 2  GERD (gastroesophageal reflux disease)  no longer has symptoms  H/O eczema  hands seasonal  current now  Herniated disc, cervical    HTN (hypertension)    Morbidly obese    Other tear of medial meniscus, current injury, left knee, initial encounter    Phlebitis of left arm  2013  Seasonal allergies

## 2021-03-22 NOTE — ASU PATIENT PROFILE, ADULT - PSH
Anal fistula  repair-1991  H/O arthroscopy of left knee    H/O:  section  1991  History of appendectomy  1999  History of cholecystectomy  2003  History of D&C  Nalini   S/P lumpectomy, right breast  2013

## 2021-03-23 ENCOUNTER — OUTPATIENT (OUTPATIENT)
Dept: OUTPATIENT SERVICES | Facility: HOSPITAL | Age: 61
LOS: 1 days | End: 2021-03-23
Payer: COMMERCIAL

## 2021-03-23 ENCOUNTER — RESULT REVIEW (OUTPATIENT)
Age: 61
End: 2021-03-23

## 2021-03-23 VITALS
OXYGEN SATURATION: 100 % | SYSTOLIC BLOOD PRESSURE: 128 MMHG | DIASTOLIC BLOOD PRESSURE: 78 MMHG | RESPIRATION RATE: 15 BRPM | TEMPERATURE: 98 F | HEART RATE: 73 BPM

## 2021-03-23 VITALS
WEIGHT: 276.02 LBS | RESPIRATION RATE: 16 BRPM | DIASTOLIC BLOOD PRESSURE: 90 MMHG | HEIGHT: 64 IN | SYSTOLIC BLOOD PRESSURE: 110 MMHG | TEMPERATURE: 99 F | HEART RATE: 94 BPM | OXYGEN SATURATION: 96 %

## 2021-03-23 DIAGNOSIS — K60.3 ANAL FISTULA: Chronic | ICD-10-CM

## 2021-03-23 DIAGNOSIS — Z98.890 OTHER SPECIFIED POSTPROCEDURAL STATES: Chronic | ICD-10-CM

## 2021-03-23 DIAGNOSIS — Z98.89 OTHER SPECIFIED POSTPROCEDURAL STATES: Chronic | ICD-10-CM

## 2021-03-23 DIAGNOSIS — S83.242A OTHER TEAR OF MEDIAL MENISCUS, CURRENT INJURY, LEFT KNEE, INITIAL ENCOUNTER: ICD-10-CM

## 2021-03-23 PROCEDURE — 29881 ARTHRS KNE SRG MNISECTMY M/L: CPT | Mod: LT

## 2021-03-23 PROCEDURE — 88304 TISSUE EXAM BY PATHOLOGIST: CPT | Mod: 26

## 2021-03-23 PROCEDURE — 82962 GLUCOSE BLOOD TEST: CPT

## 2021-03-23 PROCEDURE — 88304 TISSUE EXAM BY PATHOLOGIST: CPT

## 2021-03-23 RX ORDER — ONDANSETRON 8 MG/1
4 TABLET, FILM COATED ORAL ONCE
Refills: 0 | Status: DISCONTINUED | OUTPATIENT
Start: 2021-03-23 | End: 2021-03-23

## 2021-03-23 RX ORDER — VALSARTAN 80 MG/1
1 TABLET ORAL
Qty: 0 | Refills: 0 | DISCHARGE

## 2021-03-23 RX ORDER — ASCORBIC ACID 60 MG
0 TABLET,CHEWABLE ORAL
Qty: 0 | Refills: 0 | DISCHARGE

## 2021-03-23 RX ORDER — MELOXICAM 15 MG/1
1 TABLET ORAL
Qty: 0 | Refills: 0 | DISCHARGE

## 2021-03-23 RX ORDER — METOPROLOL TARTRATE 50 MG
1 TABLET ORAL
Qty: 0 | Refills: 0 | DISCHARGE

## 2021-03-23 RX ORDER — GLUCOSAMINE/CHONDROITIN/C/MANG 500-400 MG
1 CAPSULE ORAL
Qty: 0 | Refills: 0 | DISCHARGE

## 2021-03-23 RX ORDER — OXYCODONE HYDROCHLORIDE 5 MG/1
5 TABLET ORAL ONCE
Refills: 0 | Status: DISCONTINUED | OUTPATIENT
Start: 2021-03-23 | End: 2021-03-23

## 2021-03-23 RX ORDER — DULAGLUTIDE 4.5 MG/.5ML
0 INJECTION, SOLUTION SUBCUTANEOUS
Qty: 0 | Refills: 0 | DISCHARGE

## 2021-03-23 RX ORDER — DICLOFENAC SODIUM 30 MG/G
0 GEL TOPICAL
Qty: 0 | Refills: 0 | DISCHARGE

## 2021-03-23 RX ORDER — SODIUM CHLORIDE 9 MG/ML
1000 INJECTION, SOLUTION INTRAVENOUS
Refills: 0 | Status: DISCONTINUED | OUTPATIENT
Start: 2021-03-23 | End: 2021-03-23

## 2021-03-23 RX ORDER — TRAMADOL HYDROCHLORIDE 50 MG/1
0 TABLET ORAL
Qty: 0 | Refills: 0 | DISCHARGE

## 2021-03-23 RX ORDER — METFORMIN HYDROCHLORIDE 850 MG/1
1 TABLET ORAL
Qty: 0 | Refills: 0 | DISCHARGE

## 2021-03-23 RX ORDER — VITAMIN E 100 UNIT
0 CAPSULE ORAL
Qty: 0 | Refills: 0 | DISCHARGE

## 2021-03-23 RX ORDER — OMEGA-3 ACID ETHYL ESTERS 1 G
0 CAPSULE ORAL
Qty: 0 | Refills: 0 | DISCHARGE

## 2021-03-23 RX ORDER — HYDROMORPHONE HYDROCHLORIDE 2 MG/ML
0.5 INJECTION INTRAMUSCULAR; INTRAVENOUS; SUBCUTANEOUS
Refills: 0 | Status: DISCONTINUED | OUTPATIENT
Start: 2021-03-23 | End: 2021-03-23

## 2021-03-23 RX ADMIN — SODIUM CHLORIDE 75 MILLILITER(S): 9 INJECTION, SOLUTION INTRAVENOUS at 15:09

## 2021-03-23 RX ADMIN — SODIUM CHLORIDE 50 MILLILITER(S): 9 INJECTION, SOLUTION INTRAVENOUS at 12:49

## 2021-03-23 RX ADMIN — HYDROMORPHONE HYDROCHLORIDE 0.5 MILLIGRAM(S): 2 INJECTION INTRAMUSCULAR; INTRAVENOUS; SUBCUTANEOUS at 15:09

## 2021-03-23 NOTE — ASU DISCHARGE PLAN (ADULT/PEDIATRIC) - CARE PROVIDER_API CALL
Matthew Adams (MD)  Orthopaedic Surgery  [address] [place] [address]  Phone: [phone]  Fax: [phone]  Established Patient  Follow Up Time: 1 week

## 2021-03-25 LAB — SURGICAL PATHOLOGY STUDY: SIGNIFICANT CHANGE UP

## 2021-11-19 ENCOUNTER — TRANSCRIPTION ENCOUNTER (OUTPATIENT)
Age: 61
End: 2021-11-19

## 2022-01-25 ENCOUNTER — APPOINTMENT (OUTPATIENT)
Dept: CARDIOLOGY | Facility: CLINIC | Age: 62
End: 2022-01-25
Payer: COMMERCIAL

## 2022-01-25 ENCOUNTER — NON-APPOINTMENT (OUTPATIENT)
Age: 62
End: 2022-01-25

## 2022-01-25 VITALS
OXYGEN SATURATION: 99 % | HEART RATE: 86 BPM | DIASTOLIC BLOOD PRESSURE: 80 MMHG | HEIGHT: 64 IN | WEIGHT: 274.44 LBS | SYSTOLIC BLOOD PRESSURE: 122 MMHG | BODY MASS INDEX: 46.85 KG/M2

## 2022-01-25 DIAGNOSIS — R79.82 ELEVATED C-REACTIVE PROTEIN (CRP): ICD-10-CM

## 2022-01-25 DIAGNOSIS — E66.9 OBESITY, UNSPECIFIED: ICD-10-CM

## 2022-01-25 DIAGNOSIS — E11.9 TYPE 2 DIABETES MELLITUS W/OUT COMPLICATIONS: ICD-10-CM

## 2022-01-25 DIAGNOSIS — I10 ESSENTIAL (PRIMARY) HYPERTENSION: ICD-10-CM

## 2022-01-25 DIAGNOSIS — E78.2 MIXED HYPERLIPIDEMIA: ICD-10-CM

## 2022-01-25 DIAGNOSIS — R73.09 OTHER ABNORMAL GLUCOSE: ICD-10-CM

## 2022-01-25 PROCEDURE — 99204 OFFICE O/P NEW MOD 45 MIN: CPT

## 2022-01-25 PROCEDURE — 93000 ELECTROCARDIOGRAM COMPLETE: CPT

## 2022-01-25 RX ORDER — MENTHOL/CAMPHOR 0.5 %-0.5%
1000 LOTION (ML) TOPICAL
Refills: 0 | Status: ACTIVE | COMMUNITY

## 2022-01-25 RX ORDER — CRANBERRY FRUIT EXTRACT 200 MG
CAPSULE ORAL
Refills: 0 | Status: DISCONTINUED | COMMUNITY
End: 2022-01-25

## 2022-01-25 RX ORDER — HYDROCHLOROTHIAZIDE 12.5 MG/1
12.5 TABLET ORAL DAILY
Refills: 0 | Status: ACTIVE | COMMUNITY

## 2022-01-25 RX ORDER — OMEPRAZOLE 40 MG/1
40 CAPSULE, DELAYED RELEASE ORAL TWICE DAILY
Qty: 90 | Refills: 0 | Status: DISCONTINUED | COMMUNITY
Start: 2018-03-01 | End: 2022-01-25

## 2022-01-25 RX ORDER — DULAGLUTIDE 0.75 MG/.5ML
0.75 INJECTION, SOLUTION SUBCUTANEOUS
Refills: 0 | Status: ACTIVE | COMMUNITY

## 2022-01-25 RX ORDER — FLUTICASONE PROPIONATE 50 UG/1
50 SPRAY, METERED NASAL DAILY
Qty: 16 | Refills: 4 | Status: DISCONTINUED | COMMUNITY
Start: 2017-11-20 | End: 2022-01-25

## 2022-01-25 RX ORDER — METFORMIN HYDROCHLORIDE 850 MG/1
850 TABLET, COATED ORAL
Refills: 0 | Status: DISCONTINUED | COMMUNITY
End: 2022-01-25

## 2022-01-25 RX ORDER — VALSARTAN 40 MG/1
TABLET, COATED ORAL
Refills: 0 | Status: DISCONTINUED | COMMUNITY
End: 2022-01-25

## 2022-01-25 RX ORDER — HYDROCHLOROTHIAZIDE 12.5 MG/1
TABLET ORAL
Refills: 0 | Status: DISCONTINUED | COMMUNITY
End: 2022-01-25

## 2022-01-25 RX ORDER — VALSARTAN 320 MG/1
320 TABLET, COATED ORAL DAILY
Refills: 0 | Status: ACTIVE | COMMUNITY

## 2022-01-25 RX ORDER — METFORMIN HYDROCHLORIDE 500 MG/1
500 TABLET, COATED ORAL
Refills: 0 | Status: ACTIVE | COMMUNITY

## 2022-01-25 NOTE — PHYSICAL EXAM
[No Acute Distress] : no acute distress [Obese] : obese [Normal S1, S2] : normal S1, S2 [No Murmur] : no murmur [Clear Lung Fields] : clear lung fields [Soft] : abdomen soft [Non Tender] : non-tender [Normal Gait] : normal gait [No Edema] : no edema [No Rash] : no rash [Moves all extremities] : moves all extremities [Alert and Oriented] : alert and oriented [de-identified] : Pupils are round [de-identified] : wearing a face mask [de-identified] : No carotid bruit or JVD

## 2022-01-25 NOTE — DISCUSSION/SUMMARY
[FreeTextEntry1] : \par Hypertension: Controlled; continue valsartan and metoprolol at present doses.\par \par Hyperlipidemia:  Elevated cholesterol and high CRP in the setting of hypertension and diabetes mellitus; I told the patient that I favored initiation of statin and she agreed -- I prescribed her atorvastatin 10 mg once daily; I gave her a lab requisition to recheck a fasting lipid panel and cardiac CRP in approximately 2 months (I will call her with results).\par \par Diabetes mellitus type 2: Well controlled with a recent hemoglobin A1c 6.3%.

## 2022-01-25 NOTE — HISTORY OF PRESENT ILLNESS
[FreeTextEntry1] : Keisha Meier is a 61 year old woman with a history of hypertension, diabetes mellitus, obesity, colon polyps, GERD, who presents for cardiology consultation.  She has no past history of heart disease and describes a good baseline functional status -- works for a nonprofit organization, "always on the go,"  exercises one to 2 days per week.  She has not been experiencing angina, dyspnea, palpitations, or syncope. There is a family history of cardiac murmur but no premature CAD.

## 2022-01-25 NOTE — REVIEW OF SYSTEMS
[Weight Gain (___ Lbs)] : [unfilled] ~Ulb weight gain [Weight Loss (___ Lbs)] : [unfilled] ~Ulb weight loss [SOB] : no shortness of breath [Dyspnea on exertion] : not dyspnea during exertion [Chest Discomfort] : no chest discomfort [Palpitations] : no palpitations [Syncope] : no syncope

## 2022-04-11 NOTE — H&P PST ADULT - REASON FOR ADMISSION
"constant bleeding, giant clots, a polypectomy, a D&C, and a novasure treatment" Staging Info: By selecting yes to the question above you will include information on AJCC 8 tumor staging in your Mohs note. Information on tumor staging will be automatically added for SCCs on the head and neck. AJCC 8 includes tumor size, tumor depth, perineural involvement and bone invasion. torn  meniscus repair - left knee

## 2022-08-22 NOTE — H&P PST ADULT - HEIGHT IN FEET
Home Suture Removal Text: Patient was provided a home suture removal kit and will remove their sutures at home.  If they have any questions or difficulties they will call the office. 5

## 2023-02-09 NOTE — H&P PST ADULT - SPIRITUAL CULTURAL, CURRENT SITUATION, PROFILE
Monitor company [employer]  Length of monitor 14 days  Monitor ordered by  Rolo Lr MD   Patch ID [id number]  Device number Vinod Baer, EKG tech registered and applied monitor.
no

## 2023-11-27 NOTE — H&P PST ADULT - NSCAFFEINETYPE_GEN_ALL_CORE_SD
coffee

## 2024-01-24 ENCOUNTER — APPOINTMENT (OUTPATIENT)
Dept: GASTROENTEROLOGY | Facility: CLINIC | Age: 64
End: 2024-01-24
Payer: COMMERCIAL

## 2024-01-24 VITALS
DIASTOLIC BLOOD PRESSURE: 80 MMHG | BODY MASS INDEX: 40.97 KG/M2 | SYSTOLIC BLOOD PRESSURE: 110 MMHG | OXYGEN SATURATION: 98 % | HEIGHT: 64 IN | TEMPERATURE: 96.2 F | WEIGHT: 240 LBS | HEART RATE: 75 BPM

## 2024-01-24 DIAGNOSIS — Z86.010 PERSONAL HISTORY OF COLONIC POLYPS: ICD-10-CM

## 2024-01-24 DIAGNOSIS — Z78.9 OTHER SPECIFIED HEALTH STATUS: ICD-10-CM

## 2024-01-24 DIAGNOSIS — Z80.0 FAMILY HISTORY OF MALIGNANT NEOPLASM OF DIGESTIVE ORGANS: ICD-10-CM

## 2024-01-24 DIAGNOSIS — K21.9 GASTRO-ESOPHAGEAL REFLUX DISEASE W/OUT ESOPHAGITIS: ICD-10-CM

## 2024-01-24 PROCEDURE — 99203 OFFICE O/P NEW LOW 30 MIN: CPT

## 2024-01-24 RX ORDER — ACT.CHARCOAL/IRON/SODIUM/WATER
20 BANDAGE TOPICAL
Refills: 0 | Status: DISCONTINUED | COMMUNITY
End: 2024-01-24

## 2024-01-24 RX ORDER — CRANBERRY FRUIT EXTRACT 200 MG
CAPSULE ORAL
Refills: 0 | Status: ACTIVE | COMMUNITY

## 2024-01-24 RX ORDER — MAGNESIUM OXIDE/MAG AA CHELATE 300 MG
CAPSULE ORAL
Refills: 0 | Status: ACTIVE | COMMUNITY

## 2024-01-24 RX ORDER — SODIUM PICOSULFATE, MAGNESIUM OXIDE, AND ANHYDROUS CITRIC ACID 12; 3.5; 1 G/175ML; G/175ML; MG/175ML
10-3.5-12 MG-GM LIQUID ORAL
Qty: 2 | Refills: 0 | Status: ACTIVE | COMMUNITY
Start: 2024-01-24 | End: 1900-01-01

## 2024-01-24 RX ORDER — ATORVASTATIN CALCIUM 10 MG/1
10 TABLET, FILM COATED ORAL DAILY
Qty: 90 | Refills: 0 | Status: DISCONTINUED | COMMUNITY
Start: 2022-01-25 | End: 2024-01-24

## 2024-01-24 NOTE — ASSESSMENT
[FreeTextEntry1] : Patient with a history of multiple adenomatous colonic polyps as well as a family history of of colon cancer in her father at age 60.  She has a history of severe reflux changes seen by ENT but no longer has reflux symptoms since losing weight.  An EGD and colonoscopy have been scheduled. The risks, benefits, alternatives, and limitations of the procedures, including the possibility of missed lesions, were explained.  The patient will hold the dose of Trulicity immediately prior to the procedures.  The patient will also require anesthesia clearance given her BMI of 41.2.   Plan from 3/6/2019 - Patient with multiple adenomatous polyps and diverticulosis. She has findings of severe reflux and still requires an EGD.    We will repeat a colonoscopy in 3 years that is January 2022.    Information was given to the patient regarding diverticulosis and a high-fiber diet.    Patient is to have EGD in May.      Plan from 10/16/18 - Patient with a history of colonic polyps and a father with colon cancer at age 60 who is due for a colonoscopy. In ENT doctor found evidence of severe reflux changes.    An EGD and colonoscopy have been scheduled. The risks, benefits, alternatives, and limitations of the procedures, including the possibility of missed lesions, were explained. The patient will require anesthesia evaluation prior to the procedure given her BMI of 48.41.      Plan from 11/30/17 - Patient with a history of colonic polyps and a father with colon cancer at age 60. She is due for a screening colonoscopy.    A colonoscopy has been scheduled. The risks, benefits, alternatives, and limitations of the procedure, including the possibility of missed lesions, were explained. We will wait until the patient's ENT issues and URI symptoms resolve before performing this test. Given the patient's BMI of 42.4, the patient will require anesthesia clearance prior to the procedure.

## 2024-01-24 NOTE — PHYSICAL EXAM
[General Appearance - Alert] : alert [General Appearance - In No Acute Distress] : in no acute distress [Neck Appearance] : the appearance of the neck was normal [Neck Cervical Mass (___cm)] : no neck mass was observed [Thyroid Nodule] : there were no palpable thyroid nodules [Jugular Venous Distention Increased] : there was no jugular-venous distention [Thyroid Diffuse Enlargement] : the thyroid was not enlarged [Auscultation Breath Sounds / Voice Sounds] : lungs were clear to auscultation bilaterally [Heart Rate And Rhythm] : heart rate was normal and rhythm regular [Heart Sounds] : normal S1 and S2 [Murmurs] : no murmurs [Heart Sounds Gallop] : no gallops [Edema] : there was no peripheral edema [Heart Sounds Pericardial Friction Rub] : no pericardial rub [Bowel Sounds] : normal bowel sounds [Abdomen Soft] : soft [Abdomen Tenderness] : non-tender [] : no hepato-splenomegaly [Abdomen Mass (___ Cm)] : no abdominal mass palpated [No CVA Tenderness] : no ~M costovertebral angle tenderness [No Spinal Tenderness] : no spinal tenderness [Oriented To Time, Place, And Person] : oriented to person, place, and time [Impaired Insight] : insight and judgment were intact [Affect] : the affect was normal

## 2024-01-24 NOTE — HISTORY OF PRESENT ILLNESS
[FreeTextEntry1] : The patient is a 63-year-old woman with a history of multiple adenomatous colonic polyps and a family history of colon cancer in her father at age 60.  She also has a history of severe reflux changes seen by ENT.  She previously had reflux symptoms but has lost a significant amount of weight and now is feeling better.  She denies heartburn, dysphagia, nausea, vomiting, abdominal pain.  She reports a bowel movement every 1 to 2 days.  Stools are solid without melena or bright red blood per rectum.  The patient has lost 60 pounds over the past 10 months on weight watchers.  Her last colonoscopy was in January 2019.  The patient has not been admitted to the hospital in the past year and denies any cardiac issues.   Note from 3/6/2019 - The patient is status post colonoscopy performed on January 29, 2019. An EGD was also supposed to be done but was postponed secondary to an active upper respiratory infection. The colonoscopy was significant for removal of 5 adenomatous polyps along with diverticulosis and internal and external hemorrhoids which are asymptomatic. Although an ENT doctor is told her that she has severe reflux, she denies heartburn or dysphagia. She is feeling well otherwise with a negative review of systems.   Note from 10/16/18 - The patient has a history of adenomatous colonic polyps and a father with colon cancer at age 60. She underwent evaluation by an ENT doctor who found severe reflux changes. She is currently taking omeprazole 40 mg one to 2 times a week. She gets occasional "upset stomach" and had a globus sensation which has resolved. She denies dysphagia or abdominal pain. She has one solid bowel movement a day without melena or prior blood per rectum. The patient has gained 35 pounds in the past year. The patient has not been hospitalized in the past year and denies any cardiac issues.   Note from 11/30/17 - The patient is a 57-year-old woman with a history of adenomatous polyps and a father who had colon cancer at age 60. Her last colonoscopy was 4 years ago. She feels well denying abdominal pain, diarrhea, constipation. She has one bowel movement every one to 2 days without melena or bright red blood per rectum. She denies heartburn or dysphagia. She has lost 55 pounds in the past 5 months intentionally.  The patient is currently under the care of an ENT doctor for a paralyzed vocal cord of unclear etiology. She is currently on a Medrol Dosepak. She also has URI symptoms.   The patient has not been hospitalized in the past year and denies any cardiac issues.

## 2024-01-24 NOTE — REASON FOR VISIT
[FreeTextEntry1] : History of multiple adenomatous colonic polyps, family history of colon cancer, history of severe reflux

## 2024-01-24 NOTE — CONSULT LETTER
[FreeTextEntry1] : Dear Dr. Jose D Zuniga,  I had the pleasure of seeing your patient MANDY VALDERRAMA in the office today.  My office note is attached. PLEASE READ THE "ASSESSMENT" SECTION OF THE NOTE TO SEE MY IMPRESSION AND PLAN.  Thank you very much for allowing me to participate in the care of your patient.  Sincerely,  Aleks Fajardo M.D., FACG, FACP Director, Celiac Program at [facility]  of Medicine, [facility] at [facility] Adjunct  of Medicine, [facility] Practice Director, [facility] - Gastroenterology at [address] - [address] [place] [address] Tel: [phone] Email: [email or web address]   The attached note has been created using a voice recognition system (Dragon).  There may be some misspellings and typos.  Please call my office if you have any issues or questions.

## 2024-05-08 ENCOUNTER — APPOINTMENT (OUTPATIENT)
Dept: GASTROENTEROLOGY | Facility: AMBULATORY MEDICAL SERVICES | Age: 64
End: 2024-05-08
Payer: COMMERCIAL

## 2024-05-08 DIAGNOSIS — K29.60 OTHER GASTRITIS W/OUT BLEEDING: ICD-10-CM

## 2024-05-08 PROCEDURE — 45380 COLONOSCOPY AND BIOPSY: CPT | Mod: 33

## 2024-05-08 PROCEDURE — 43239 EGD BIOPSY SINGLE/MULTIPLE: CPT | Mod: 59

## 2024-05-08 RX ORDER — PANTOPRAZOLE 40 MG/1
40 TABLET, DELAYED RELEASE ORAL
Qty: 90 | Refills: 1 | Status: ACTIVE | COMMUNITY
Start: 2024-05-08 | End: 1900-01-01

## 2024-06-20 NOTE — ASU DISCHARGE PLAN (ADULT/PEDIATRIC) - CONDITION AT DISCHARGE
In an effort to ensure that our patients LiveWell, a Team Member has reviewed your chart and identified an opportunity to provide the best care possible. An attempt was made to discuss or schedule due or overdue Preventive or Chronic Condition care.    The Outcome was Contact was made, appointment declined. Care Gaps identified: Cervical Cancer Screening.    *pt stated she does not have insurance and does not know if she will return to clinic. PCP removed per pt*    
Stable

## 2024-08-07 ENCOUNTER — APPOINTMENT (OUTPATIENT)
Dept: GASTROENTEROLOGY | Facility: CLINIC | Age: 64
End: 2024-08-07

## 2025-01-08 NOTE — ASU PATIENT PROFILE, ADULT - VISION (WITH CORRECTIVE LENSES IF THE PATIENT USUALLY WEARS THEM):
CC/HPI:  Ambar Hargrove is a 84 year old female that presents for followup    HISTORY OF PRESENT ILLNESS:  84 yr old female here for followup  She is doing well and has no complaints  She saw GI at  regarding elev LFT's  No RUQ pain, no jaundice, no N/V    She had MRCP in 9/24  No evidence of biliary stricture or choledocholithiasis.                                                                                                                                                                                                                                                                                                                                                                                                                                                                 in a postcholecystectomy patient.  3. No beading or stricturing of the intrahepatic biliary tree.    Was recommended to have an EUS but she declines                                                                                                                                                                                                                                                                                                                                                                                                                                                                                                                                                                                                                                                                                                                                                                                                                                                                                                                                                                                     /64-Taking amlodipine and  losartan/hydrochlorothiazide  No CP/SOB/Syncope/edema/palpitations     Following Endocrinology for Hyperthyroidism-taking methimazole                      Patient Active Problem List   Diagnosis    Hypertension    Shoulder pain    LBP (low back pain)    Headache(784.0)    Cough    Hyperthyroidism    Abnormal weight loss    Anemia    Muscle spasm    Sinusitis chronic, frontal    History of postmenopausal osteoporosis    Vitamin D deficiency    HPTH (hyperparathyroidism)  (CMD)    Encounter for therapeutic drug monitoring    Acute nasopharyngitis    Nausea    Dysfunction of right eustachian tube    Dizziness    Anxiety    Palpitations    Eustachian tube dysfunction, left    Skin lesion of cheek    Multinodular goiter    Osteoporosis    Common bile duct dilation    Renal cyst, left    DDD (degenerative disc disease), lumbar    Sinusitis, acute frontal    Graves disease    Persistent cough    Hyperopia with presbyopia, bilateral    Nuclear sclerotic cataract of both eyes    Diverticulitis of sigmoid colon    Diverticulitis of intestine with abscess    Colonic diverticular abscess    S/P partial colectomy    TIA (transient ischemic attack)    Transaminitis    Stage 3a chronic kidney disease  (CMD)    Essential hypertension    History of TIA (transient ischemic attack)    Mixed hyperlipidemia    Statin intolerance    Elevated liver function tests       Past Medical History:   Diagnosis Date    Abdominal pain, epigastric 7/12/2013    Abnormal weight loss 7/26/2015    Acute bronchitis 9/17/2013    Acute frontal sinusitis 7/1/2011    Angular cheilitis 12/1/2012    Arthritis     Asthma, cough variant (CMD) 5/31/2015    Basal cell carcinoma 03/2019    sternal notch    Bronchitis     Cataract     Chronic kidney disease     Cough 12/11/2012    Essential hypertension, benign     Gastroenteritis 12/31/2011    Graves disease 10/15/2019    Hand pain 10/21/2011    Headache(784.0) 4/19/2012    Hip pain     chronic     Hypercholesteremia     Hypertension 12/31/2011    Hyperthyroidism 7/12/2013    LBP (low back pain) 2/11/2012    Multinodular goiter     Night sweats 4/25/2015    Osteoporosis, unspecified 9/17/2009    Otalgia of right ear 5/31/2015    Recurrent UTI     Sciatica     Shoulder clicking 12/29/2011    Statin intolerance 6/19/2024    UTI (urinary tract infection)     recurrent    Vertigo        Past Surgical History:   Procedure Laterality Date    Appendectomy      Colectomy  12/16/2021    sigmoid colectomy- Dr Caldera    Colonoscopy  05/28/2021    Inj epid/subchd anest,lumb/sac  08/22/2012    Caudal THEO (+) repeat (-) SIJ    Inj epid/subchd anest,lumb/sac  12/19/2012    Caudal THEO   OV    Inj transforam epidural lumbar/sacral  07/18/2012    LT L4-5 L5-S1 TF-ESIrepeat    Paravertebral facet inj jnt lumbar sacral 1  06/20/2012    #1.L.L3-5 MBB (+) repeat (-) L-THEO    Removal gallbladder  1962    Cholecystectomy (gallbladder)    Removal of tonsils,<14 y/o         Family History   Problem Relation Age of Onset    Heart disease Father        Social History     Socioeconomic History    Marital status: /Civil Union     Spouse name: Not on file    Number of children: Not on file    Years of education: Not on file    Highest education level: Not on file   Occupational History    Occupation: retired   Tobacco Use    Smoking status: Never    Smokeless tobacco: Never   Vaping Use    Vaping status: never used   Substance and Sexual Activity    Alcohol use: No    Drug use: No    Sexual activity: Yes     Partners: Male   Other Topics Concern    Not on file   Social History Narrative     and 2 kids    Retired from factory work.     Social Determinants of Health     Financial Resource Strain: Low Risk  (9/11/2023)    Financial Resource Strain     Unable to Get: None   Food Insecurity: Not At Risk (9/10/2023)    Food Insecurity     Food Insecurity: Worried or Stressed about Money for Food: Never   Transportation Needs: Not At  Risk (9/10/2023)    PRAPARE - Transportation     Lack of Transportation (Medical): No     Lack of Transportation (Non-Medical): No   Physical Activity: Not on file   Stress: Not on file   Social Connections: Low Risk  (9/10/2023)    Social Connections     Social Connectivity: 5 or more times a week   Interpersonal Safety: Low Risk  (8/26/2024)    Interpersonal Safety     How often physically hurt: Never     How often insulted or talked down to: Never     How often threatened with harm: Never     How often scream or curse at: Never       Current Outpatient Medications   Medication Sig Dispense Refill    LORazepam (ATIVAN) 0.5 MG tablet Take 1 tablet by mouth daily as needed for Anxiety. (Patient not taking: Reported on 1/8/2025) 30 tablet 0    clopidogrel (PLAVIX) 75 MG tablet TAKE 1 TABLET BY MOUTH DAILY 90 tablet 1    amLODIPine (NORVASC) 5 MG tablet TAKE 1/2 TABLET BY MOUTH DAILY 45 tablet 1    losartan-hydrochlorothiazide (HYZAAR) 100-12.5 MG per tablet TAKE 1 TABLET BY MOUTH DAILY 90 tablet 1    ondansetron (ZOFRAN ODT) 4 MG disintegrating tablet Place 1 tablet onto the tongue every 6 hours. (Patient not taking: Reported on 8/30/2024) 10 tablet 0    Omega-3 Fatty Acids (Fish Oil) 1000 MG CAPSULE DELAYED RELEASE Take 2 capsules by mouth daily. (Patient not taking: Reported on 8/30/2024)      alendronate (FOSAMAX) 70 MG tablet Take 1 tablet by mouth every 7 days. 12 tablet 3    methIMAzole (TAPAZOLE) 5 MG tablet 1 tab 3 days a week on Monday, Wednesday and friday 45 tablet 3    METAMUCIL FIBER PO Take 1 Package by mouth daily.      Cholecalciferol 100 mcg (4,000 units) capsule Take 100 mcg by mouth daily.       No current facility-administered medications for this visit.       ALLERGIES:   Allergen Reactions    Codeine GI UPSET and NAUSEA    Opioid Analgesics INSOMNIA    Statins Other (See Comments)    Zithromax [Azithromycin] DIARRHEA       REVIEW OF SYSTEMS:    General - Denies fever, chills, sweats,  fatigue.  Skin - Denies new or changing lesions.  Heent - Denies upper respiratory symptoms.  Neck - Denies pain or masses.  Heart - Denies chest pain, palpitations,shortness of breath.  Lungs - Denies cough, wheezing.  Abdomen - Denies pain, nausea, vomiting, diarrhea, changes in or problem with bowel habits, blood in stool.  Extremities - Denies joint pain, muscle pain or swelling.  Genitourinary - Denies urinary symptoms, dysuria, frequency, urgency, incontinence.  Lymphatic - Denies lymph node swelling.  Hematologic - Denies easy bleeding or bruising.  Neurologic - Denies headache,  visual symptoms, numbness, paresthesias, weakness.  Psychiatric- Denies suicidal/homicidal ideation, hallucinations, delusions, depression.    PHYSICAL EXAM:  Visit Vitals  /64 (BP Location: LUE - Left upper extremity, Patient Position: Standing, Cuff Size: Regular)   Pulse 76   Temp 97.1 °F (36.2 °C) (Temporal)   Resp 16   Wt 69.7 kg (153 lb 9.6 oz)   SpO2 95%   BMI 31.02 kg/m²     General: Alert, in no acute distress.  Skin: Warm with normal turgor, no rash.  Head: Atraumatic and normocephalic.  Eyes: Eyelids and conjunctivae appear normal, EOM normal, MARIA ALEJANDRA.  Nose: Normal and patent, no erythema, discharge or polyps.  Ears: R - Tympanic membrane is clear and normal appearing and canals normal.            L - Tympanic membrane is clear and normal appearing and canals normal.  Throat: Oropharynx is clear, no redness or exudate present.  Neck: Supple with no significant adenopathy, no thyromegaly.  No bruits or jugular venous distention.  Lungs: Clear to auscultation, no wheezing, crackles or rhonchi noted.  Heart: Regular rate and rhythm.  S1, S2, no murmur present, no extra sounds.  Abdomen: Soft, nondistended, nontender, no guarding or masses, normoactive bowel   sounds, no abdominal bruits. No hepatosplenomegaly.  Musculoskeletal: Spine normal.  Extremities: Full range of motion upper and lower extremities, with normal  appearing joints.  No edema, normal strength in all extremities.  Neurologic: No focal deficits,   Psychiatric:  Normal mood and affect.    Ambar was seen today for office visit.    Diagnoses and all orders for this visit:    Essential hypertension    History of TIA (transient ischemic attack)    Mixed hyperlipidemia    Statin intolerance    Stage 3a chronic kidney disease  (CMD)    Elevated liver function tests    Stage 3b chronic kidney disease  (CMD)      Cont clopidogrel for hx of TIA-stable ,Cannot tolerate statins  BP Controlled on amlodipine and losartan/hydrochlorothiazide  Followup with GI  CKD3 stable -stay hydrated, no nephrotoxins   [id number] methimazole-cont Endo Followup  F/U 6 months for ALEXIS              Glasses/Normal vision: sees adequately in most situations; can see medication labels, newsprint

## 2025-03-05 NOTE — H&P PST ADULT - ADMIT DATE
Call Center TCM Note      Flowsheet Row Responses   [facility] facility patient discharged from? [facility]   Does the patient have one of the following disease processes/diagnoses(primary or secondary)? Stroke   TCM attempt successful? No   Unsuccessful attempts Attempt 1            Maria Elena Orellana MA    3/5/2025, 12:50 EST        
12-Mar-2021